# Patient Record
(demographics unavailable — no encounter records)

---

## 2025-04-20 NOTE — HISTORY OF PRESENT ILLNESS
[FreeTextEntry1] : Elliot is a 17-year-old white male, currently living with his mother primarily, parents  1-1/2 years ago, still has very close relationship with his father, who lives down the block, enrolled in 12th grade at [employer], general education, no prior psychiatric history, no prior ED visits or hospitalizations, no history of NSSIB, SI or suicide attempts, no past medical history, no CPS involvement, no trauma or abuse history, no legal issues, history of cannabis experimentation and intermittent use with friends, no aggression or violence, now presenting referred by mother after having escalating concerns for patient's wellbeing given some behavioral concerns.  Patient seen.  He reports that he is feeling "very well."  He says that he believes the visit was precipitated by his mother discovering a bong in the backyard.  He says that while he has used cannabis previously, he denies any ongoing use or cravings.  He says that when he has used, it was in social situations, denying any persistent or daily use.  He states that things are generally going well, saying that his mood is stable, denies any persistent negative mood, anhedonia, neurovegetative symptoms, nor ever experiencing thoughts of hopelessness/helplessness, or thoughts of self-harm and/or suicide. Patient denies/does not display symptoms of zandra including decreased need for sleep, elevated self-esteem, feelings of elation, persistently elevated energy, increase in goal directed activity, grandiosity, pressured speech, flight of ideas, racing thoughts, increased risk taking behaviors. Patient denies/does not display symptoms of psychosis including disorganization of speech/thought, auditory/visual hallucinations, preoccupations/delusions. Patient denies anxiety, panic, obsessions/compulsions, phobic anxiety. Patient denied any history of emotional, verbal, physical or sexual trauma or abuse.  He does acknowledge that the coming transition from high school is reading some stress for him but he feels that he can manage it.  He also feels that he has a clear plan now.  Mother provided collateral, obtained by [facility] Nelida Gordon. Mother reports that she has noticed a change in pts mood and behaviors over the last few years which has been concerning for her. As a young child, she describes having concern for ADHD (trouble focusing, needing 1:1 support to complete and concentrate on tasks), but never had pt formally evaluated, and teachers did not endorse any behavioral or academic concerns. However, as pt grew older and entered high school, pts grades began to decline and teachers would comment on pt struggling to stay focused. Mother feels that pt generally has very low motivation, particularly for academic tasks. Mother has tried to encourage pt to engage in more, such as starting a part time job or getting his license. Recently, mother has felt concerned that pt has been smoking marijuana, although pt has adamantly denied this to her. Mother reports that she found a bag with marijuana paraphernalia in the backyard, including a bong. After confronting pt about this, pt stated that he was holding these items for a friend. Mother denies that pt has ever expressed any SI/HI, and she denies being aware of any hx of SIB/SA. Mother denies any hx of abuse. Mother mentions another psychosocial stressor for pt may be parental divorce which occurred 1.5 years ago. Pt has a positive relationship with father and still sees him regularly, although mother feels father has a different parenting style. Mother denies having any acute safety concerns for pt at this time. She is receptive to linkage to individual therapy.  [FreeTextEntry2] : Patient has not had any past psychiatric visits, hospitalizations, medication trials or visits with a therapist. No hx suicidality, suicide attempts or self injurious behaviors. Mother had begun exploring ADHD in the past but was consistently told that he does not have the dx  [FreeTextEntry3] : none

## 2025-04-20 NOTE — RISK ASSESSMENT
[Clinical Interview] : Clinical Interview [Collateral Sources] : Collateral Sources [No] : No [Other: ___] : [unfilled] [Identifies reasons for living] : identifies reasons for living [Positive therapeutic relationships] : positive therapeutic relationships [Responsibility to children, family, or others] : responsibility to children, family, or others [Fear of death/actual act of killing self] : fear of death or the actual act of killing self [Engaged in work or school] : engaged in work or school [None in the patient's lifetime] : None in the patient's lifetime [None Known] : none known [No known risk factors] : No known risk factors [Residential stability] : residential stability [Affective stability] : affective stability [Yes] : yes [de-identified] :  no access to lethal means or weapons

## 2025-04-20 NOTE — SOCIAL HISTORY
[None Known] : none known [Yes] : yes [No Known Use] : no known use [TextBox_7] : reports hx of social use  [FreeTextEntry1] : reports hx of social use

## 2025-04-20 NOTE — HISTORY OF PRESENT ILLNESS
[FreeTextEntry1] : Elliot is a 17-year-old white male, currently living with his mother primarily, parents  1-1/2 years ago, still has very close relationship with his father, who lives down the block, enrolled in 12th grade at [employer], general education, no prior psychiatric history, no prior ED visits or hospitalizations, no history of NSSIB, SI or suicide attempts, no past medical history, no CPS involvement, no trauma or abuse history, no legal issues, history of cannabis experimentation and intermittent use with friends, no aggression or violence, now presenting referred by mother after having escalating concerns for patient's wellbeing given some behavioral concerns.  Patient seen.  He reports that he is feeling "very well."  He says that he believes the visit was precipitated by his mother discovering a bong in the backyard.  He says that while he has used cannabis previously, he denies any ongoing use or cravings.  He says that when he has used, it was in social situations, denying any persistent or daily use.  He states that things are generally going well, saying that his mood is stable, denies any persistent negative mood, anhedonia, neurovegetative symptoms, nor ever experiencing thoughts of hopelessness/helplessness, or thoughts of self-harm and/or suicide. Patient denies/does not display symptoms of zandra including decreased need for sleep, elevated self-esteem, feelings of elation, persistently elevated energy, increase in goal directed activity, grandiosity, pressured speech, flight of ideas, racing thoughts, increased risk taking behaviors. Patient denies/does not display symptoms of psychosis including disorganization of speech/thought, auditory/visual hallucinations, preoccupations/delusions. Patient denies anxiety, panic, obsessions/compulsions, phobic anxiety. Patient denied any history of emotional, verbal, physical or sexual trauma or abuse.  He does acknowledge that the coming transition from high school is reading some stress for him but he feels that he can manage it.  He also feels that he has a clear plan now.  Mother provided collateral, obtained by [facility] Nelida Gordon. Mother reports that she has noticed a change in pts mood and behaviors over the last few years which has been concerning for her. As a young child, she describes having concern for ADHD (trouble focusing, needing 1:1 support to complete and concentrate on tasks), but never had pt formally evaluated, and teachers did not endorse any behavioral or academic concerns. However, as pt grew older and entered high school, pts grades began to decline and teachers would comment on pt struggling to stay focused. Mother feels that pt generally has very low motivation, particularly for academic tasks. Mother has tried to encourage pt to engage in more, such as starting a part time job or getting his license. Recently, mother has felt concerned that pt has been smoking marijuana, although pt has adamantly denied this to her. Mother reports that she found a bag with marijuana paraphernalia in the backyard, including a bong. After confronting pt about this, pt stated that he was holding these items for a friend. Mother denies that pt has ever expressed any SI/HI, and she denies being aware of any hx of SIB/SA. Mother denies any hx of abuse. Mother mentions another psychosocial stressor for pt may be parental divorce which occurred 1.5 years ago. Pt has a positive relationship with father and still sees him regularly, although mother feels father has a different parenting style. Mother denies having any acute safety concerns for pt at this time. She is receptive to linkage to individual therapy.  [FreeTextEntry2] : Patient has not had any past psychiatric visits, hospitalizations, medication trials or visits with a therapist. No hx suicidality, suicide attempts or self injurious behaviors. Mother had begun exploring ADHD in the past but was consistently told that he does not have the dx  [FreeTextEntry3] : none

## 2025-04-20 NOTE — DISCUSSION/SUMMARY
[Low acute suicide risk] : Low acute suicide risk [Yes] : Safety Plan completed/updated (for individuals at risk): Yes [FreeTextEntry1] : At present, patient has a low acute risk of harm to self.  Although patient has risk factors including history of anxiety, patient has significant protective factors including strong family/social support, domiciled, age, lack of prior self-harm, no suicide attempts, no zandra, no psychosis, no CAH, no psychiatric hospitalization, current willingness to engage in treatment, participation in safety planning, future orientation with long & short term goals for the future, hopeful, help-seeking, engaged in school & activities, current denial of any SIIP or urges to self-harm, no reported hx of abuse/trauma, no aggression/violence, no access to guns/family is able to means restrict, no legal history.

## 2025-04-20 NOTE — HISTORY OF PRESENT ILLNESS
[FreeTextEntry1] : Elliot is a 17-year-old white male, currently living with his mother primarily, parents  1-1/2 years ago, still has very close relationship with his father, who lives down the block, enrolled in 12th grade at [employer], general education, no prior psychiatric history, no prior ED visits or hospitalizations, no history of NSSIB, SI or suicide attempts, no past medical history, no CPS involvement, no trauma or abuse history, no legal issues, history of cannabis experimentation and intermittent use with friends, no aggression or violence, now presenting referred by mother after having escalating concerns for patient's wellbeing given some behavioral concerns.  Patient seen.  He reports that he is feeling "very well."  He says that he believes the visit was precipitated by his mother discovering a bong in the backyard.  He says that while he has used cannabis previously, he denies any ongoing use or cravings.  He says that when he has used, it was in social situations, denying any persistent or daily use.  He states that things are generally going well, saying that his mood is stable, denies any persistent negative mood, anhedonia, neurovegetative symptoms, nor ever experiencing thoughts of hopelessness/helplessness, or thoughts of self-harm and/or suicide. Patient denies/does not display symptoms of zandra including decreased need for sleep, elevated self-esteem, feelings of elation, persistently elevated energy, increase in goal directed activity, grandiosity, pressured speech, flight of ideas, racing thoughts, increased risk taking behaviors. Patient denies/does not display symptoms of psychosis including disorganization of speech/thought, auditory/visual hallucinations, preoccupations/delusions. Patient denies anxiety, panic, obsessions/compulsions, phobic anxiety. Patient denied any history of emotional, verbal, physical or sexual trauma or abuse.  He does acknowledge that the coming transition from high school is reading some stress for him but he feels that he can manage it.  He also feels that he has a clear plan now.  Mother provided collateral, obtained by [facility] Nelida Gordon. Mother reports that she has noticed a change in pts mood and behaviors over the last few years which has been concerning for her. As a young child, she describes having concern for ADHD (trouble focusing, needing 1:1 support to complete and concentrate on tasks), but never had pt formally evaluated, and teachers did not endorse any behavioral or academic concerns. However, as pt grew older and entered high school, pts grades began to decline and teachers would comment on pt struggling to stay focused. Mother feels that pt generally has very low motivation, particularly for academic tasks. Mother has tried to encourage pt to engage in more, such as starting a part time job or getting his license. Recently, mother has felt concerned that pt has been smoking marijuana, although pt has adamantly denied this to her. Mother reports that she found a bag with marijuana paraphernalia in the backyard, including a bong. After confronting pt about this, pt stated that he was holding these items for a friend. Mother denies that pt has ever expressed any SI/HI, and she denies being aware of any hx of SIB/SA. Mother denies any hx of abuse. Mother mentions another psychosocial stressor for pt may be parental divorce which occurred 1.5 years ago. Pt has a positive relationship with father and still sees him regularly, although mother feels father has a different parenting style. Mother denies having any acute safety concerns for pt at this time. She is receptive to linkage to individual therapy.  [FreeTextEntry3] : none [FreeTextEntry2] : Patient has not had any past psychiatric visits, hospitalizations, medication trials or visits with a therapist. No hx suicidality, suicide attempts or self injurious behaviors. Mother had begun exploring ADHD in the past but was consistently told that he does not have the dx

## 2025-04-20 NOTE — RISK ASSESSMENT
[Clinical Interview] : Clinical Interview [Collateral Sources] : Collateral Sources [No] : No [Other: ___] : [unfilled] [Identifies reasons for living] : identifies reasons for living [Positive therapeutic relationships] : positive therapeutic relationships [Responsibility to children, family, or others] : responsibility to children, family, or others [Fear of death/actual act of killing self] : fear of death or the actual act of killing self [Engaged in work or school] : engaged in work or school [None in the patient's lifetime] : None in the patient's lifetime [None Known] : none known [No known risk factors] : No known risk factors [Residential stability] : residential stability [Affective stability] : affective stability [Yes] : yes [de-identified] :  no access to lethal means or weapons

## 2025-04-20 NOTE — REASON FOR VISIT
[Behavioral Health Urgent Care Assessment] : a behavioral health urgent care assessment [Patient] : patient [Self] : alone [Mother] : with mother [TextBox_17] : concern for possible underlying psychiatric difficulties

## 2025-04-20 NOTE — HISTORY OF PRESENT ILLNESS
[FreeTextEntry1] : Elliot is a 17-year-old white male, currently living with his mother primarily, parents  1-1/2 years ago, still has very close relationship with his father, who lives down the block, enrolled in 12th grade at [employer], general education, no prior psychiatric history, no prior ED visits or hospitalizations, no history of NSSIB, SI or suicide attempts, no past medical history, no CPS involvement, no trauma or abuse history, no legal issues, history of cannabis experimentation and intermittent use with friends, no aggression or violence, now presenting referred by mother after having escalating concerns for patient's wellbeing given some behavioral concerns.  Patient seen.  He reports that he is feeling "very well."  He says that he believes the visit was precipitated by his mother discovering a bong in the backyard.  He says that while he has used cannabis previously, he denies any ongoing use or cravings.  He says that when he has used, it was in social situations, denying any persistent or daily use.  He states that things are generally going well, saying that his mood is stable, denies any persistent negative mood, anhedonia, neurovegetative symptoms, nor ever experiencing thoughts of hopelessness/helplessness, or thoughts of self-harm and/or suicide. Patient denies/does not display symptoms of zandra including decreased need for sleep, elevated self-esteem, feelings of elation, persistently elevated energy, increase in goal directed activity, grandiosity, pressured speech, flight of ideas, racing thoughts, increased risk taking behaviors. Patient denies/does not display symptoms of psychosis including disorganization of speech/thought, auditory/visual hallucinations, preoccupations/delusions. Patient denies anxiety, panic, obsessions/compulsions, phobic anxiety. Patient denied any history of emotional, verbal, physical or sexual trauma or abuse.  He does acknowledge that the coming transition from high school is reading some stress for him but he feels that he can manage it.  He also feels that he has a clear plan now.  Mother provided collateral, obtained by [facility] Nelida Gordon. Mother reports that she has noticed a change in pts mood and behaviors over the last few years which has been concerning for her. As a young child, she describes having concern for ADHD (trouble focusing, needing 1:1 support to complete and concentrate on tasks), but never had pt formally evaluated, and teachers did not endorse any behavioral or academic concerns. However, as pt grew older and entered high school, pts grades began to decline and teachers would comment on pt struggling to stay focused. Mother feels that pt generally has very low motivation, particularly for academic tasks. Mother has tried to encourage pt to engage in more, such as starting a part time job or getting his license. Recently, mother has felt concerned that pt has been smoking marijuana, although pt has adamantly denied this to her. Mother reports that she found a bag with marijuana paraphernalia in the backyard, including a bong. After confronting pt about this, pt stated that he was holding these items for a friend. Mother denies that pt has ever expressed any SI/HI, and she denies being aware of any hx of SIB/SA. Mother denies any hx of abuse. Mother mentions another psychosocial stressor for pt may be parental divorce which occurred 1.5 years ago. Pt has a positive relationship with father and still sees him regularly, although mother feels father has a different parenting style. Mother denies having any acute safety concerns for pt at this time. She is receptive to linkage to individual therapy.  [FreeTextEntry3] : none [FreeTextEntry2] : Patient has not had any past psychiatric visits, hospitalizations, medication trials or visits with a therapist. No hx suicidality, suicide attempts or self injurious behaviors. Mother had begun exploring ADHD in the past but was consistently told that he does not have the dx

## 2025-04-20 NOTE — RISK ASSESSMENT
[Clinical Interview] : Clinical Interview [Collateral Sources] : Collateral Sources [No] : No [Other: ___] : [unfilled] [Identifies reasons for living] : identifies reasons for living [Positive therapeutic relationships] : positive therapeutic relationships [Responsibility to children, family, or others] : responsibility to children, family, or others [Fear of death/actual act of killing self] : fear of death or the actual act of killing self [Engaged in work or school] : engaged in work or school [None in the patient's lifetime] : None in the patient's lifetime [None Known] : none known [No known risk factors] : No known risk factors [Residential stability] : residential stability [Affective stability] : affective stability [Yes] : yes [de-identified] :  no access to lethal means or weapons

## 2025-04-20 NOTE — RISK ASSESSMENT
[Clinical Interview] : Clinical Interview [Collateral Sources] : Collateral Sources [No] : No [Other: ___] : [unfilled] [Identifies reasons for living] : identifies reasons for living [Positive therapeutic relationships] : positive therapeutic relationships [Responsibility to children, family, or others] : responsibility to children, family, or others [Fear of death/actual act of killing self] : fear of death or the actual act of killing self [Engaged in work or school] : engaged in work or school [None in the patient's lifetime] : None in the patient's lifetime [None Known] : none known [No known risk factors] : No known risk factors [Residential stability] : residential stability [Affective stability] : affective stability [Yes] : yes [de-identified] :  no access to lethal means or weapons

## 2025-04-20 NOTE — PLAN
[TextBox_9] : refer to individual therapy [TextBox_13] :  no clinical indication for pharmacotherapy at this time [TextBox_26] : no school consent provided